# Patient Record
(demographics unavailable — no encounter records)

---

## 2017-11-14 NOTE — RAD
LEFT HAND THREE VIEWS

11/14/17

 

HISTORY: 

Left hand injury. Dyspnea. 

 

FINDINGS:  

Subtle oblique fracture involves the metaphysis at the base of the proximal phalanx little finger. O
verlying soft tissue swelling is apparent. 

 

IMPRESSION:  

Metaphyseal fracture left middle finger proximal phalanx.

 

POS: [facility]

## 2019-01-13 NOTE — RAD
THREE VIEWS RIGHT ANKLE:

 

DATE: 1/13/2019.

 

COMPARISON: 

None.

 

HISTORY: 

Fall, trauma, pain.

 

FINDINGS: 

There is prominent soft tissue swelling overlying the lateral malleolus.  The patient is skeletally i
mmature.  There is no displaced fracture or dislocation.  A Salter-Kahn I fracture of the distal fi
bula cannot be excluded. 

 

IMPRESSION: 

Lateral soft tissue swelling at the level of the lateral malleolus.  No displaced fracture or disloca
tion seen.

 

POS: [facility]

## 2019-01-13 NOTE — RAD
RIGHT FOOT 3 VIEWS:

 

DATE: 1/13/2019.

 

COMPARISON: 

None.

 

HISTORY: 

Pain, injury, fall.

 

FINDINGS: 

The patient is skeletally immature.  There is no displaced fracture or evidence of dislocation seen w
ithin the foot.  There is soft tissue swelling overlying the lateral malleolus, better assessed on th
e ankle series.

 

IMPRESSION: 

Lateral soft tissue swelling.

 

POS: [facility]

## 2019-01-23 NOTE — RAD
RIGHT ANKLE THREE VIEWS:

 

HISTORY:

Ankle sprain.

 

COMPARISON:

None.

 

FINDINGS:

There is no acute fracture or malalignment.  The physeal plates are normal.

 

Minimal lateral malleolar soft tissue swelling.

 

IMPRESSION:

Minimal lateral malleolar soft tissue swelling without acute displaced fracture or malalignment.

 

POS: [facility]